# Patient Record
Sex: FEMALE | Race: WHITE | Employment: OTHER | URBAN - METROPOLITAN AREA
[De-identification: names, ages, dates, MRNs, and addresses within clinical notes are randomized per-mention and may not be internally consistent; named-entity substitution may affect disease eponyms.]

---

## 2017-07-24 NOTE — PROCEDURE NOTE: CLINICAL
PROCEDURE NOTE: Punctal Plugs, Silicone OD. Diagnosis: Dry Eye Syndrome. Prior to treatment, the risks/benefits/alternatives were discussed. The patient wished to proceed with procedure. Permanent silicone plugs were inserted. Patient tolerated procedure well. There were no complications. Post procedure instructions given. Size 7 mm. Samuel Celestin

## 2018-10-23 ENCOUNTER — NEW PATIENT (OUTPATIENT)
Dept: URBAN - METROPOLITAN AREA CLINIC 26 | Facility: CLINIC | Age: 78
End: 2018-10-23

## 2018-10-23 VITALS — HEIGHT: 64 IN | WEIGHT: 165 LBS | BODY MASS INDEX: 28.17 KG/M2

## 2018-10-23 DIAGNOSIS — H02.833: ICD-10-CM

## 2018-10-23 DIAGNOSIS — H26.491: ICD-10-CM

## 2018-10-23 DIAGNOSIS — H02.836: ICD-10-CM

## 2018-10-23 DIAGNOSIS — H35.372: ICD-10-CM

## 2018-10-23 DIAGNOSIS — H43.811: ICD-10-CM

## 2018-10-23 DIAGNOSIS — H02.052: ICD-10-CM

## 2018-10-23 DIAGNOSIS — H43.393: ICD-10-CM

## 2018-10-23 DIAGNOSIS — H04.123: ICD-10-CM

## 2018-10-23 DIAGNOSIS — H53.8: ICD-10-CM

## 2018-10-23 PROCEDURE — 67820 REVISE EYELASHES: CPT

## 2018-10-23 PROCEDURE — 92250 FUNDUS PHOTOGRAPHY W/I&R: CPT

## 2018-10-23 PROCEDURE — 92004 COMPRE OPH EXAM NEW PT 1/>: CPT

## 2018-10-23 ASSESSMENT — VISUAL ACUITY
OS_SC: 20/20-1
OD_SC: 20/40

## 2018-10-23 ASSESSMENT — TONOMETRY
OS_IOP_MMHG: 10
OD_IOP_MMHG: 12

## 2022-07-30 ENCOUNTER — TELEPHONE ENCOUNTER (OUTPATIENT)
Age: 82
End: 2022-07-30

## 2022-07-31 ENCOUNTER — TELEPHONE ENCOUNTER (OUTPATIENT)
Age: 82
End: 2022-07-31

## 2023-04-20 ENCOUNTER — APPOINTMENT (RX ONLY)
Dept: URBAN - METROPOLITAN AREA CLINIC 333 | Facility: CLINIC | Age: 83
Setting detail: DERMATOLOGY
End: 2023-04-20

## 2023-04-20 DIAGNOSIS — K13.0 DISEASES OF LIPS: ICD-10-CM | Status: INADEQUATELY CONTROLLED

## 2023-04-20 PROCEDURE — ? COUNSELING

## 2023-04-20 PROCEDURE — ? PRESCRIPTION

## 2023-04-20 PROCEDURE — 99203 OFFICE O/P NEW LOW 30 MIN: CPT

## 2023-04-20 RX ORDER — MOMETASONE FUROATE 1 MG/G
1 OINTMENT TOPICAL TWICE A DAY
Qty: 15 | Refills: 2 | Status: ERX | COMMUNITY
Start: 2023-04-20

## 2023-04-20 RX ADMIN — MOMETASONE FUROATE 1: 1 OINTMENT TOPICAL at 00:00

## 2023-04-20 ASSESSMENT — LOCATION ZONE DERM: LOCATION ZONE: LIP

## 2023-04-20 ASSESSMENT — LOCATION DETAILED DESCRIPTION DERM: LOCATION DETAILED: RIGHT SUPERIOR VERMILION LIP

## 2023-04-20 ASSESSMENT — LOCATION SIMPLE DESCRIPTION DERM: LOCATION SIMPLE: RIGHT LIP

## 2023-04-20 NOTE — PROCEDURE: COUNSELING
Detail Level: Zone
Patient Specific Counseling (Will Not Stick From Patient To Patient): Ham,y cold compress twice a day for 10-15 minutes. Continue using the aquaphor as needed.

## 2023-04-20 NOTE — HPI: RASH
Is This A New Presentation, Or A Follow-Up?: Rash
Additional History: Patient has been applying aquaphor to the affected area.

## 2025-04-25 ENCOUNTER — P2P PATIENT RECORD (OUTPATIENT)
Age: 85
End: 2025-04-25